# Patient Record
Sex: FEMALE | Employment: OTHER | URBAN - METROPOLITAN AREA
[De-identification: names, ages, dates, MRNs, and addresses within clinical notes are randomized per-mention and may not be internally consistent; named-entity substitution may affect disease eponyms.]

---

## 2017-08-28 ENCOUNTER — LAB ENCOUNTER (OUTPATIENT)
Dept: LAB | Facility: HOSPITAL | Age: 67
End: 2017-08-28
Attending: ORTHOPAEDIC SURGERY
Payer: MEDICARE

## 2017-08-28 DIAGNOSIS — Z01.818 PRE-OP TESTING: Primary | ICD-10-CM

## 2017-08-28 PROBLEM — E78.00 PURE HYPERCHOLESTEROLEMIA: Status: ACTIVE | Noted: 2017-08-28

## 2017-08-28 PROBLEM — N28.1 BENIGN CYST OF RIGHT KIDNEY: Status: ACTIVE | Noted: 2017-08-28

## 2017-08-28 LAB
ANTIBODY SCREEN: NEGATIVE
MRSA DNA SPEC QL NAA+PROBE: NEGATIVE
RH BLOOD TYPE: POSITIVE

## 2017-08-28 PROCEDURE — 36415 COLL VENOUS BLD VENIPUNCTURE: CPT

## 2017-08-28 PROCEDURE — 86850 RBC ANTIBODY SCREEN: CPT

## 2017-08-28 PROCEDURE — 87641 MR-STAPH DNA AMP PROBE: CPT

## 2017-08-28 PROCEDURE — 86901 BLOOD TYPING SEROLOGIC RH(D): CPT

## 2017-08-28 PROCEDURE — 86900 BLOOD TYPING SEROLOGIC ABO: CPT

## 2017-09-05 ENCOUNTER — APPOINTMENT (OUTPATIENT)
Dept: PHYSICAL THERAPY | Facility: HOSPITAL | Age: 67
End: 2017-09-05
Attending: ORTHOPAEDIC SURGERY
Payer: MEDICARE

## 2017-09-07 ENCOUNTER — OFFICE VISIT (OUTPATIENT)
Dept: PHYSICAL THERAPY | Facility: HOSPITAL | Age: 67
End: 2017-09-07
Attending: ORTHOPAEDIC SURGERY
Payer: MEDICARE

## 2017-09-07 PROCEDURE — 87086 URINE CULTURE/COLONY COUNT: CPT | Performed by: INTERNAL MEDICINE

## 2017-09-07 PROCEDURE — 81001 URINALYSIS AUTO W/SCOPE: CPT | Performed by: INTERNAL MEDICINE

## 2017-09-07 NOTE — PROGRESS NOTES
Pre-Op Evaluation    Date of surgery: 9/18/17   Procedure: L TKA  Physician: Dr. Izzy Clemons situation/occupation: Patient lives with her , both retired.  Patient has 3 stairs to enter home, no railings, 4 stairs down from there to bathroom, k

## 2017-09-08 RX ORDER — ACETAMINOPHEN 500 MG
500 TABLET ORAL EVERY 8 HOURS PRN
COMMUNITY
End: 2017-09-21

## 2017-09-08 NOTE — DISCHARGE PLANNING
9/8CM-Pre Admission Screening: This Writer met with Patient for a preadmission screening.  The Patient resides with her  in [facility]  in a single family  home with 4 stairs to her kitchen, 8 stairs,  3 steps to enter.   The Patient has been

## 2017-09-15 NOTE — H&P
ORTHO SURGERY H&P  Guero Tinsley is a 78year old female. MRN is [id number].  Admitted: (Not on file)    CC: Left knee pain    HPI: Ms. Jenny Alanis is a 78year old female with a known history of left knee osteoarthritis who presents complaining of left knee p MEQ tablets, one tablet PO BID  Simvastatin- 40 mg tablets, one tablet PO daily  Vitamin D3- 400 unit tablets, one tablet PO daily                  Review Of Systems:     HEENT: Reports dentures. Otherwise negative. Musculoskeletal: See HPI.    All other mechanical symptoms related to the surgery, blood clot, neurovascular injury, anesthetic complications, and loss of life or limb. In addition, the patient has no contraindications to the surgery such as active infection or neuropathic joint.  Questions were

## 2017-09-18 ENCOUNTER — HOSPITAL ENCOUNTER (INPATIENT)
Facility: HOSPITAL | Age: 67
LOS: 3 days | Discharge: NURSING, REHAB OR LONG-TERM CARE | DRG: 470 | End: 2017-09-21
Attending: ORTHOPAEDIC SURGERY | Admitting: ORTHOPAEDIC SURGERY
Payer: MEDICARE

## 2017-09-18 ENCOUNTER — APPOINTMENT (OUTPATIENT)
Dept: GENERAL RADIOLOGY | Facility: HOSPITAL | Age: 67
DRG: 470 | End: 2017-09-18
Attending: ORTHOPAEDIC SURGERY
Payer: MEDICARE

## 2017-09-18 ENCOUNTER — SURGERY (OUTPATIENT)
Age: 67
End: 2017-09-18

## 2017-09-18 ENCOUNTER — ANESTHESIA (OUTPATIENT)
Dept: SURGERY | Facility: HOSPITAL | Age: 67
End: 2017-09-18

## 2017-09-18 ENCOUNTER — ANESTHESIA EVENT (OUTPATIENT)
Dept: SURGERY | Facility: HOSPITAL | Age: 67
End: 2017-09-18

## 2017-09-18 DIAGNOSIS — M17.12 PRIMARY OSTEOARTHRITIS OF LEFT KNEE: Primary | ICD-10-CM

## 2017-09-18 PROBLEM — E11.65 TYPE 2 DIABETES MELLITUS WITH HYPERGLYCEMIA (HCC): Status: ACTIVE | Noted: 2017-09-18

## 2017-09-18 PROBLEM — R00.1 BRADYCARDIA: Status: ACTIVE | Noted: 2017-09-18

## 2017-09-18 LAB
GLUCOSE BLDC GLUCOMTR-MCNC: 162 MG/DL (ref 70–99)
GLUCOSE BLDC GLUCOMTR-MCNC: 175 MG/DL (ref 70–99)

## 2017-09-18 PROCEDURE — C9113 INJ PANTOPRAZOLE SODIUM, VIA: HCPCS | Performed by: INTERNAL MEDICINE

## 2017-09-18 PROCEDURE — 73560 X-RAY EXAM OF KNEE 1 OR 2: CPT | Performed by: ORTHOPAEDIC SURGERY

## 2017-09-18 PROCEDURE — 88305 TISSUE EXAM BY PATHOLOGIST: CPT | Performed by: ORTHOPAEDIC SURGERY

## 2017-09-18 PROCEDURE — 82962 GLUCOSE BLOOD TEST: CPT

## 2017-09-18 PROCEDURE — 97161 PT EVAL LOW COMPLEX 20 MIN: CPT

## 2017-09-18 PROCEDURE — 97110 THERAPEUTIC EXERCISES: CPT

## 2017-09-18 PROCEDURE — 88311 DECALCIFY TISSUE: CPT | Performed by: ORTHOPAEDIC SURGERY

## 2017-09-18 PROCEDURE — 0SRD0J9 REPLACEMENT OF LEFT KNEE JOINT WITH SYNTHETIC SUBSTITUTE, CEMENTED, OPEN APPROACH: ICD-10-PCS | Performed by: ORTHOPAEDIC SURGERY

## 2017-09-18 DEVICE — PSN FEM CR CMT CCR STD SZ6 L: Type: IMPLANTABLE DEVICE | Site: KNEE | Status: FUNCTIONAL

## 2017-09-18 DEVICE — PSN TIB STM 5 DEG SZ E L: Type: IMPLANTABLE DEVICE | Site: KNEE | Status: FUNCTIONAL

## 2017-09-18 DEVICE — PSN ALL POLY PAT PLY 32MM: Type: IMPLANTABLE DEVICE | Site: KNEE | Status: FUNCTIONAL

## 2017-09-18 DEVICE — CEMENT BONE ZIM PALICOS R +G: Type: IMPLANTABLE DEVICE | Site: KNEE | Status: FUNCTIONAL

## 2017-09-18 RX ORDER — ACETAMINOPHEN 325 MG/1
650 TABLET ORAL ONCE
Status: COMPLETED | OUTPATIENT
Start: 2017-09-18 | End: 2017-09-18

## 2017-09-18 RX ORDER — ONDANSETRON 2 MG/ML
4 INJECTION INTRAMUSCULAR; INTRAVENOUS ONCE AS NEEDED
Status: DISCONTINUED | OUTPATIENT
Start: 2017-09-18 | End: 2017-09-18 | Stop reason: HOSPADM

## 2017-09-18 RX ORDER — ONDANSETRON 2 MG/ML
4 INJECTION INTRAMUSCULAR; INTRAVENOUS ONCE AS NEEDED
Status: COMPLETED | OUTPATIENT
Start: 2017-09-18 | End: 2017-09-18

## 2017-09-18 RX ORDER — HALOPERIDOL 5 MG/ML
0.25 INJECTION INTRAMUSCULAR ONCE AS NEEDED
Status: DISCONTINUED | OUTPATIENT
Start: 2017-09-18 | End: 2017-09-18 | Stop reason: HOSPADM

## 2017-09-18 RX ORDER — HYDROMORPHONE HYDROCHLORIDE 1 MG/ML
0.4 INJECTION, SOLUTION INTRAMUSCULAR; INTRAVENOUS; SUBCUTANEOUS EVERY 5 MIN PRN
Status: DISCONTINUED | OUTPATIENT
Start: 2017-09-18 | End: 2017-09-18 | Stop reason: HOSPADM

## 2017-09-18 RX ORDER — DIPHENHYDRAMINE HYDROCHLORIDE 50 MG/ML
12.5 INJECTION INTRAMUSCULAR; INTRAVENOUS EVERY 4 HOURS PRN
Status: DISCONTINUED | OUTPATIENT
Start: 2017-09-18 | End: 2017-09-21

## 2017-09-18 RX ORDER — DIPHENHYDRAMINE HYDROCHLORIDE 50 MG/ML
25 INJECTION INTRAMUSCULAR; INTRAVENOUS ONCE AS NEEDED
Status: ACTIVE | OUTPATIENT
Start: 2017-09-18 | End: 2017-09-18

## 2017-09-18 RX ORDER — SENNOSIDES 8.6 MG
17.2 TABLET ORAL NIGHTLY
Status: DISCONTINUED | OUTPATIENT
Start: 2017-09-18 | End: 2017-09-21

## 2017-09-18 RX ORDER — MORPHINE SULFATE 4 MG/ML
4 INJECTION, SOLUTION INTRAMUSCULAR; INTRAVENOUS EVERY 2 HOUR PRN
Status: DISCONTINUED | OUTPATIENT
Start: 2017-09-18 | End: 2017-09-21

## 2017-09-18 RX ORDER — GABAPENTIN 100 MG/1
100 CAPSULE ORAL 2 TIMES DAILY
Status: DISCONTINUED | OUTPATIENT
Start: 2017-09-18 | End: 2017-09-21

## 2017-09-18 RX ORDER — NALOXONE HYDROCHLORIDE 0.4 MG/ML
0.08 INJECTION, SOLUTION INTRAMUSCULAR; INTRAVENOUS; SUBCUTANEOUS
Status: ACTIVE | OUTPATIENT
Start: 2017-09-18 | End: 2017-09-19

## 2017-09-18 RX ORDER — METOCLOPRAMIDE 10 MG/1
10 TABLET ORAL ONCE
Status: COMPLETED | OUTPATIENT
Start: 2017-09-18 | End: 2017-09-18

## 2017-09-18 RX ORDER — SODIUM CHLORIDE 9 MG/ML
INJECTION, SOLUTION INTRAVENOUS CONTINUOUS
Status: DISCONTINUED | OUTPATIENT
Start: 2017-09-18 | End: 2017-09-21

## 2017-09-18 RX ORDER — SODIUM CHLORIDE 0.9 % (FLUSH) 0.9 %
10 SYRINGE (ML) INJECTION AS NEEDED
Status: DISCONTINUED | OUTPATIENT
Start: 2017-09-18 | End: 2017-09-21

## 2017-09-18 RX ORDER — DEXTROSE MONOHYDRATE 25 G/50ML
50 INJECTION, SOLUTION INTRAVENOUS AS NEEDED
Status: DISCONTINUED | OUTPATIENT
Start: 2017-09-18 | End: 2017-09-21

## 2017-09-18 RX ORDER — ONDANSETRON 2 MG/ML
4 INJECTION INTRAMUSCULAR; INTRAVENOUS EVERY 4 HOURS PRN
Status: DISCONTINUED | OUTPATIENT
Start: 2017-09-18 | End: 2017-09-21

## 2017-09-18 RX ORDER — HALOPERIDOL 5 MG/ML
0.5 INJECTION INTRAMUSCULAR ONCE AS NEEDED
Status: ACTIVE | OUTPATIENT
Start: 2017-09-18 | End: 2017-09-18

## 2017-09-18 RX ORDER — SODIUM CHLORIDE, SODIUM LACTATE, POTASSIUM CHLORIDE, CALCIUM CHLORIDE 600; 310; 30; 20 MG/100ML; MG/100ML; MG/100ML; MG/100ML
INJECTION, SOLUTION INTRAVENOUS CONTINUOUS
Status: DISCONTINUED | OUTPATIENT
Start: 2017-09-18 | End: 2017-09-21

## 2017-09-18 RX ORDER — ATENOLOL AND CHLORTHALIDONE 50; 25 MG/1; MG/1
1 TABLET ORAL
Status: DISCONTINUED | OUTPATIENT
Start: 2017-09-19 | End: 2017-09-18

## 2017-09-18 RX ORDER — NALBUPHINE HCL 10 MG/ML
2.5 AMPUL (ML) INJECTION EVERY 4 HOURS PRN
Status: ACTIVE | OUTPATIENT
Start: 2017-09-18 | End: 2017-09-19

## 2017-09-18 RX ORDER — BUPIVACAINE HYDROCHLORIDE 7.5 MG/ML
INJECTION, SOLUTION INTRASPINAL AS NEEDED
Status: DISCONTINUED | OUTPATIENT
Start: 2017-09-18 | End: 2017-09-18 | Stop reason: SURG

## 2017-09-18 RX ORDER — HYDROCODONE BITARTRATE AND ACETAMINOPHEN 5; 325 MG/1; MG/1
1 TABLET ORAL AS NEEDED
Status: DISCONTINUED | OUTPATIENT
Start: 2017-09-18 | End: 2017-09-18 | Stop reason: HOSPADM

## 2017-09-18 RX ORDER — DEXTROSE, SODIUM CHLORIDE, AND POTASSIUM CHLORIDE 5; .45; .15 G/100ML; G/100ML; G/100ML
INJECTION INTRAVENOUS CONTINUOUS
Status: DISCONTINUED | OUTPATIENT
Start: 2017-09-18 | End: 2017-09-18

## 2017-09-18 RX ORDER — BISACODYL 10 MG
10 SUPPOSITORY, RECTAL RECTAL
Status: DISCONTINUED | OUTPATIENT
Start: 2017-09-18 | End: 2017-09-21

## 2017-09-18 RX ORDER — OXYCODONE HCL 10 MG/1
10 TABLET, FILM COATED, EXTENDED RELEASE ORAL ONCE
Status: COMPLETED | OUTPATIENT
Start: 2017-09-18 | End: 2017-09-18

## 2017-09-18 RX ORDER — DIPHENHYDRAMINE HCL 25 MG
25 CAPSULE ORAL EVERY 4 HOURS PRN
Status: DISCONTINUED | OUTPATIENT
Start: 2017-09-18 | End: 2017-09-21

## 2017-09-18 RX ORDER — MORPHINE SULFATE 10 MG/ML
6 INJECTION, SOLUTION INTRAMUSCULAR; INTRAVENOUS EVERY 10 MIN PRN
Status: DISCONTINUED | OUTPATIENT
Start: 2017-09-18 | End: 2017-09-18 | Stop reason: HOSPADM

## 2017-09-18 RX ORDER — HYDROCODONE BITARTRATE AND ACETAMINOPHEN 7.5; 325 MG/1; MG/1
2 TABLET ORAL EVERY 6 HOURS PRN
Status: ACTIVE | OUTPATIENT
Start: 2017-09-18 | End: 2017-09-19

## 2017-09-18 RX ORDER — DIPHENHYDRAMINE HCL 25 MG
25 CAPSULE ORAL EVERY 4 HOURS PRN
Status: ACTIVE | OUTPATIENT
Start: 2017-09-18 | End: 2017-09-19

## 2017-09-18 RX ORDER — HYDROCODONE BITARTRATE AND ACETAMINOPHEN 7.5; 325 MG/1; MG/1
1 TABLET ORAL EVERY 6 HOURS PRN
Status: DISPENSED | OUTPATIENT
Start: 2017-09-18 | End: 2017-09-19

## 2017-09-18 RX ORDER — MAGNESIUM HYDROXIDE 1200 MG/15ML
LIQUID ORAL CONTINUOUS PRN
Status: DISCONTINUED | OUTPATIENT
Start: 2017-09-18 | End: 2017-09-18

## 2017-09-18 RX ORDER — ACETAMINOPHEN 325 MG/1
650 TABLET ORAL EVERY 6 HOURS PRN
Status: ACTIVE | OUTPATIENT
Start: 2017-09-18 | End: 2017-09-19

## 2017-09-18 RX ORDER — HYDROMORPHONE HYDROCHLORIDE 1 MG/ML
0.2 INJECTION, SOLUTION INTRAMUSCULAR; INTRAVENOUS; SUBCUTANEOUS EVERY 5 MIN PRN
Status: DISCONTINUED | OUTPATIENT
Start: 2017-09-18 | End: 2017-09-18 | Stop reason: HOSPADM

## 2017-09-18 RX ORDER — MELATONIN
325
Status: DISCONTINUED | OUTPATIENT
Start: 2017-09-18 | End: 2017-09-21

## 2017-09-18 RX ORDER — LOSARTAN POTASSIUM 25 MG/1
25 TABLET ORAL
Status: DISCONTINUED | OUTPATIENT
Start: 2017-09-19 | End: 2017-09-21

## 2017-09-18 RX ORDER — MORPHINE SULFATE 4 MG/ML
4 INJECTION, SOLUTION INTRAMUSCULAR; INTRAVENOUS EVERY 10 MIN PRN
Status: DISCONTINUED | OUTPATIENT
Start: 2017-09-18 | End: 2017-09-18 | Stop reason: HOSPADM

## 2017-09-18 RX ORDER — MORPHINE SULFATE 2 MG/ML
2 INJECTION, SOLUTION INTRAMUSCULAR; INTRAVENOUS EVERY 2 HOUR PRN
Status: DISCONTINUED | OUTPATIENT
Start: 2017-09-18 | End: 2017-09-21

## 2017-09-18 RX ORDER — DIPHENHYDRAMINE HYDROCHLORIDE 50 MG/ML
12.5 INJECTION INTRAMUSCULAR; INTRAVENOUS EVERY 4 HOURS PRN
Status: ACTIVE | OUTPATIENT
Start: 2017-09-18 | End: 2017-09-19

## 2017-09-18 RX ORDER — POLYETHYLENE GLYCOL 3350 17 G/17G
17 POWDER, FOR SOLUTION ORAL DAILY PRN
Status: DISCONTINUED | OUTPATIENT
Start: 2017-09-18 | End: 2017-09-21

## 2017-09-18 RX ORDER — MORPHINE SULFATE 2 MG/ML
2 INJECTION, SOLUTION INTRAMUSCULAR; INTRAVENOUS EVERY 10 MIN PRN
Status: DISCONTINUED | OUTPATIENT
Start: 2017-09-18 | End: 2017-09-18 | Stop reason: HOSPADM

## 2017-09-18 RX ORDER — HYDROMORPHONE HYDROCHLORIDE 1 MG/ML
0.6 INJECTION, SOLUTION INTRAMUSCULAR; INTRAVENOUS; SUBCUTANEOUS EVERY 5 MIN PRN
Status: DISCONTINUED | OUTPATIENT
Start: 2017-09-18 | End: 2017-09-18 | Stop reason: HOSPADM

## 2017-09-18 RX ORDER — FAMOTIDINE 10 MG/ML
20 INJECTION, SOLUTION INTRAVENOUS 2 TIMES DAILY
Status: DISCONTINUED | OUTPATIENT
Start: 2017-09-18 | End: 2017-09-18

## 2017-09-18 RX ORDER — HYDROCODONE BITARTRATE AND ACETAMINOPHEN 5; 325 MG/1; MG/1
2 TABLET ORAL AS NEEDED
Status: DISCONTINUED | OUTPATIENT
Start: 2017-09-18 | End: 2017-09-18 | Stop reason: HOSPADM

## 2017-09-18 RX ORDER — HYDROCODONE BITARTRATE AND ACETAMINOPHEN 7.5; 325 MG/1; MG/1
1 TABLET ORAL EVERY 4 HOURS PRN
Status: DISCONTINUED | OUTPATIENT
Start: 2017-09-18 | End: 2017-09-21

## 2017-09-18 RX ORDER — TRANEXAMIC ACID 100 MG/ML
INJECTION, SOLUTION INTRAVENOUS AS NEEDED
Status: DISCONTINUED | OUTPATIENT
Start: 2017-09-18 | End: 2017-09-18 | Stop reason: SURG

## 2017-09-18 RX ORDER — MIDAZOLAM HYDROCHLORIDE 1 MG/ML
INJECTION INTRAMUSCULAR; INTRAVENOUS AS NEEDED
Status: DISCONTINUED | OUTPATIENT
Start: 2017-09-18 | End: 2017-09-18 | Stop reason: SURG

## 2017-09-18 RX ORDER — CELECOXIB 200 MG/1
200 CAPSULE ORAL DAILY
Status: DISCONTINUED | OUTPATIENT
Start: 2017-09-19 | End: 2017-09-21

## 2017-09-18 RX ORDER — POTASSIUM CHLORIDE 20 MEQ/1
20 TABLET, EXTENDED RELEASE ORAL 2 TIMES DAILY
Status: DISCONTINUED | OUTPATIENT
Start: 2017-09-18 | End: 2017-09-21

## 2017-09-18 RX ORDER — DOCUSATE SODIUM 100 MG/1
100 CAPSULE, LIQUID FILLED ORAL 2 TIMES DAILY
Status: DISCONTINUED | OUTPATIENT
Start: 2017-09-18 | End: 2017-09-21

## 2017-09-18 RX ORDER — MORPHINE SULFATE 2 MG/ML
1 INJECTION, SOLUTION INTRAMUSCULAR; INTRAVENOUS EVERY 2 HOUR PRN
Status: DISCONTINUED | OUTPATIENT
Start: 2017-09-18 | End: 2017-09-21

## 2017-09-18 RX ORDER — NALOXONE HYDROCHLORIDE 0.4 MG/ML
80 INJECTION, SOLUTION INTRAMUSCULAR; INTRAVENOUS; SUBCUTANEOUS AS NEEDED
Status: DISCONTINUED | OUTPATIENT
Start: 2017-09-18 | End: 2017-09-18 | Stop reason: HOSPADM

## 2017-09-18 RX ORDER — GABAPENTIN 100 MG/1
100 CAPSULE ORAL ONCE
Status: COMPLETED | OUTPATIENT
Start: 2017-09-18 | End: 2017-09-18

## 2017-09-18 RX ORDER — OXYCODONE HCL 10 MG/1
10 TABLET, FILM COATED, EXTENDED RELEASE ORAL EVERY 12 HOURS
Status: DISCONTINUED | OUTPATIENT
Start: 2017-09-18 | End: 2017-09-21

## 2017-09-18 RX ORDER — FAMOTIDINE 20 MG/1
20 TABLET ORAL 2 TIMES DAILY
Status: DISCONTINUED | OUTPATIENT
Start: 2017-09-18 | End: 2017-09-18

## 2017-09-18 RX ORDER — FAMOTIDINE 20 MG/1
20 TABLET ORAL ONCE
Status: COMPLETED | OUTPATIENT
Start: 2017-09-18 | End: 2017-09-18

## 2017-09-18 RX ORDER — SODIUM PHOSPHATE, DIBASIC AND SODIUM PHOSPHATE, MONOBASIC 7; 19 G/133ML; G/133ML
1 ENEMA RECTAL ONCE AS NEEDED
Status: DISCONTINUED | OUTPATIENT
Start: 2017-09-18 | End: 2017-09-21

## 2017-09-18 RX ORDER — METOCLOPRAMIDE HYDROCHLORIDE 5 MG/ML
10 INJECTION INTRAMUSCULAR; INTRAVENOUS EVERY 6 HOURS PRN
Status: DISPENSED | OUTPATIENT
Start: 2017-09-18 | End: 2017-09-20

## 2017-09-18 RX ORDER — HYDROMORPHONE HYDROCHLORIDE 1 MG/ML
0.6 INJECTION, SOLUTION INTRAMUSCULAR; INTRAVENOUS; SUBCUTANEOUS
Status: ACTIVE | OUTPATIENT
Start: 2017-09-18 | End: 2017-09-19

## 2017-09-18 RX ORDER — MORPHINE SULFATE 1 MG/ML
INJECTION, SOLUTION EPIDURAL; INTRATHECAL; INTRAVENOUS AS NEEDED
Status: DISCONTINUED | OUTPATIENT
Start: 2017-09-18 | End: 2017-09-18 | Stop reason: SURG

## 2017-09-18 RX ORDER — HYDROMORPHONE HYDROCHLORIDE 1 MG/ML
0.4 INJECTION, SOLUTION INTRAMUSCULAR; INTRAVENOUS; SUBCUTANEOUS
Status: ACTIVE | OUTPATIENT
Start: 2017-09-18 | End: 2017-09-19

## 2017-09-18 RX ADMIN — BUPIVACAINE HYDROCHLORIDE 1.5 ML: 7.5 INJECTION, SOLUTION INTRASPINAL at 07:48:00

## 2017-09-18 RX ADMIN — SODIUM CHLORIDE, SODIUM LACTATE, POTASSIUM CHLORIDE, CALCIUM CHLORIDE: 600; 310; 30; 20 INJECTION, SOLUTION INTRAVENOUS at 07:33:00

## 2017-09-18 RX ADMIN — TRANEXAMIC ACID 1000 MG: 100 INJECTION, SOLUTION INTRAVENOUS at 09:20:00

## 2017-09-18 RX ADMIN — TRANEXAMIC ACID 1000 MG: 100 INJECTION, SOLUTION INTRAVENOUS at 08:03:00

## 2017-09-18 RX ADMIN — MIDAZOLAM HYDROCHLORIDE 2 MG: 1 INJECTION INTRAMUSCULAR; INTRAVENOUS at 07:36:00

## 2017-09-18 RX ADMIN — MORPHINE SULFATE 0.3 MG: 1 INJECTION, SOLUTION EPIDURAL; INTRATHECAL; INTRAVENOUS at 07:48:00

## 2017-09-18 RX ADMIN — SODIUM CHLORIDE, SODIUM LACTATE, POTASSIUM CHLORIDE, CALCIUM CHLORIDE: 600; 310; 30; 20 INJECTION, SOLUTION INTRAVENOUS at 09:49:00

## 2017-09-18 NOTE — ANESTHESIA PREPROCEDURE EVALUATION
Anesthesia PreOp Note    HPI:     Ish Wong is a 78year old female who presents for preoperative consultation requested by: Lillian Li MD    Date of Surgery: 9/18/2017    Procedure(s):  KNEE TOTAL REPLACEMENT  Indication: Left knee Gisselle Binu Disp: 180 tablet Rfl: 3 9/17/2017 at 0900   Calcium Carbonate Antacid 600 MG Oral Chew Tab Chew 600 mg by mouth 2 (two) times daily. Disp:  Rfl:  9/16/2017   Cholecalciferol (VITAMIN D3) 400 UNITS Oral Tab Take 400 Units by mouth.  Disp:  Rfl:  9/17/2017 at 33.2 08/29/2017   RDW 13.4 08/29/2017    08/29/2017       Lab Results  Component Value Date    08/29/2017   K 4.0 08/29/2017    (L) 08/29/2017   CO2 26.4 08/29/2017   BUN 14 08/29/2017   CREATSERUM 0.67 08/29/2017    (H) 08/29/201 complications, and any alternative forms of anesthetic management. All of the patient's questions were answered to the best of my ability. The patient desires the anesthetic management as planned.   Audi Mensah  9/18/2017 7:17 AM

## 2017-09-18 NOTE — ANESTHESIA PROCEDURE NOTES
Spinal Block  Performed by: Olman Rollins  Authorized by: Alice Nash     Patient Location:  OR  Start Time:  9/18/2017 7:46 AM  End Time:  9/18/2017 7:48 AM  Site identification: surface landmarks    Anesthesiologist:  Alice Nash  CRNA:  RENATA QUIROS

## 2017-09-18 NOTE — PHYSICAL THERAPY NOTE
PHYSICAL THERAPY KNEE EVALUATION - INPATIENT     Room Number: 123/238-F  Evaluation Date: 9/18/2017  Type of Evaluation: Initial  Physician Order: PT Eval and Treat    Presenting Problem: L TKA  Reason for Therapy: Mobility Dysfunction and Discharge Planni Problem:    Primary osteoarthritis of left knee  Active Problems:    Essential hypertension    Multiple lung nodules on CT    Pure hypercholesterolemia    Type 2 diabetes mellitus with hyperglycemia (HCC)    Bradycardia      Past Medical History  Past Medi degrees    Lower extremity strength is within functional limits; pt displayed L LE strength deficit during bed mobility.     BALANCE  Static Sitting: Normal  Dynamic Sitting: Normal  Static Standing: Fair +  Dynamic Standing: Fair + be met by: 9/25/17  Patient Goal Patient's self-stated goal is: to walk without pain   Goal #1 Patient is able to demonstrate supine - sit EOB @ level: modified independent     Goal #1   Current Status    Goal #2 Patient is able to demonstrate transfers Si

## 2017-09-18 NOTE — PHYSICAL THERAPY NOTE
Attempted to see pt this PM but the pt was nauseated per RN and family.  RN states that she will give pt medication for nausea and will attempt later as time permits

## 2017-09-18 NOTE — DISCHARGE PLANNING
9/18CM-MD orders received in regards to discharge planning-Please look into [facility]. This Writer met with the Patient and her  German Medina ([phone]) at bedside.  They are both requesting a referral to [facility], and Huan JOHNSON to send out referr

## 2017-09-18 NOTE — H&P
[facility]    History and Physical    Denver Lords Patient Status:  Inpatient    1950 MRN [id number]   Location [facility] 4W/SW/SE Attending Delphine Diamond MD   Hosp Day # 0 PCP Edy Cornejo MD     Date:  20 (500 mg total) by mouth daily with breakfast.   Atenolol-Chlorthalidone 50-25 MG Oral Tab Take 1 tablet by mouth once daily.    LOSARTAN POTASSIUM 25 MG Oral Tab TAKE 1 TABLET BY MOUTH ONCE DAILY   Potassium Chloride ER 20 MEQ Oral Tab CR Take 1 tablet (20 no rales. Abdominal: Soft. She exhibits no mass. There is no tenderness. Musculoskeletal:   Bulky dressings in place left knee   Lymphadenopathy:     She has no cervical adenopathy. Neurological: She is alert and oriented to person, place, and time. metformin at current time. We will monitor Accu-Cheks and cover with sliding scale insulin as needed. Continue same lipid-lowering agent when patient is taking po. Patient does have a history of benign-appearing lung nodules on CT.   She gets yearly

## 2017-09-18 NOTE — ANESTHESIA POSTPROCEDURE EVALUATION
Patient: Reza Lopez    Procedure Summary     Date:  09/18/17 Room / Location:  [address] [place] [address] MAIN OR 12 / [address] [place] [address] MAIN OR    Anesthesia Start:  4611 Anesthesia Stop:  6562    Procedure:  KNEE TOTAL REPLACEMENT (Left ) Diagnosis:  (Left knee osteoarthritis, primary)

## 2017-09-18 NOTE — OPERATIVE REPORT
[facility]    PATIENT'S NAME: Teresita Ashly DAVIS   ATTENDING PHYSICIAN: Elver Vides MD   OPERATING PHYSICIAN: Elver Vides MD   PATIENT ACCOUNT#:   [id number]    LOCATION:  [facility] [address] PACU [facility] 10  MEDICAL RECORD #:   [id number]       D technique. Next, a tourniquet was placed on the left thigh and SCD device was placed on the right lower extremity. All bony prominences were well padded. She was given preoperative antibiotics and tranexamic acid.   The left knee and lower extremity were fragments were again sized on the back table and appeared consistent with the preoperative plan. Next, attention was turned to the tibia. The patient-specific pin guide for the tibia was applied to the anteromedial tibia.   The anterior and superior pins paid to hemostasis. The knee was copiously irrigated. All excess cement was removed. The retinacular layer was repaired over a 1/8-inch Hemovac drain which exited the anterolateral thigh. We used #1 Ethibond sutures for retinacular closure.   Skin and s

## 2017-09-19 PROBLEM — E87.6 HYPOKALEMIA: Status: ACTIVE | Noted: 2017-09-19

## 2017-09-19 LAB
ANION GAP SERPL CALC-SCNC: 10 MMOL/L (ref 0–18)
BASOPHILS # BLD: 0 K/UL (ref 0–0.2)
BASOPHILS NFR BLD: 0 %
BUN SERPL-MCNC: 12 MG/DL (ref 8–20)
BUN/CREAT SERPL: 19.7 (ref 10–20)
CALCIUM SERPL-MCNC: 8 MG/DL (ref 8.5–10.5)
CHLORIDE SERPL-SCNC: 95 MMOL/L (ref 95–110)
CO2 SERPL-SCNC: 27 MMOL/L (ref 22–32)
CREAT SERPL-MCNC: 0.61 MG/DL (ref 0.5–1.5)
EOSINOPHIL # BLD: 0 K/UL (ref 0–0.7)
EOSINOPHIL NFR BLD: 0 %
ERYTHROCYTE [DISTWIDTH] IN BLOOD BY AUTOMATED COUNT: 13.8 % (ref 11–15)
GLUCOSE BLDC GLUCOMTR-MCNC: 145 MG/DL (ref 70–99)
GLUCOSE SERPL-MCNC: 135 MG/DL (ref 70–99)
HCT VFR BLD AUTO: 33.4 % (ref 35–48)
HGB BLD-MCNC: 11.2 G/DL (ref 12–16)
LYMPHOCYTES # BLD: 2.2 K/UL (ref 1–4)
LYMPHOCYTES NFR BLD: 21 %
MCH RBC QN AUTO: 29.3 PG (ref 27–32)
MCHC RBC AUTO-ENTMCNC: 33.6 G/DL (ref 32–37)
MCV RBC AUTO: 87.3 FL (ref 80–100)
MONOCYTES # BLD: 0.7 K/UL (ref 0–1)
MONOCYTES NFR BLD: 7 %
NEUTROPHILS # BLD AUTO: 7.6 K/UL (ref 1.8–7.7)
NEUTROPHILS NFR BLD: 72 %
OSMOLALITY UR CALC.SUM OF ELEC: 276 MOSM/KG (ref 275–295)
PLATELET # BLD AUTO: 147 K/UL (ref 140–400)
PMV BLD AUTO: 9 FL (ref 7.4–10.3)
POTASSIUM SERPL-SCNC: 2.9 MMOL/L (ref 3.3–5.1)
POTASSIUM SERPL-SCNC: 3.3 MMOL/L (ref 3.3–5.1)
RBC # BLD AUTO: 3.82 M/UL (ref 3.7–5.4)
SODIUM SERPL-SCNC: 132 MMOL/L (ref 136–144)
WBC # BLD AUTO: 10.5 K/UL (ref 4–11)

## 2017-09-19 PROCEDURE — 84132 ASSAY OF SERUM POTASSIUM: CPT | Performed by: INTERNAL MEDICINE

## 2017-09-19 PROCEDURE — 97110 THERAPEUTIC EXERCISES: CPT

## 2017-09-19 PROCEDURE — 97530 THERAPEUTIC ACTIVITIES: CPT

## 2017-09-19 PROCEDURE — 97116 GAIT TRAINING THERAPY: CPT

## 2017-09-19 PROCEDURE — 82962 GLUCOSE BLOOD TEST: CPT

## 2017-09-19 PROCEDURE — 80048 BASIC METABOLIC PNL TOTAL CA: CPT | Performed by: INTERNAL MEDICINE

## 2017-09-19 PROCEDURE — 97165 OT EVAL LOW COMPLEX 30 MIN: CPT

## 2017-09-19 PROCEDURE — 97535 SELF CARE MNGMENT TRAINING: CPT

## 2017-09-19 PROCEDURE — C9113 INJ PANTOPRAZOLE SODIUM, VIA: HCPCS | Performed by: INTERNAL MEDICINE

## 2017-09-19 PROCEDURE — 85025 COMPLETE CBC W/AUTO DIFF WBC: CPT | Performed by: INTERNAL MEDICINE

## 2017-09-19 RX ORDER — POTASSIUM CHLORIDE 20 MEQ/1
40 TABLET, EXTENDED RELEASE ORAL ONCE
Status: COMPLETED | OUTPATIENT
Start: 2017-09-19 | End: 2017-09-19

## 2017-09-19 RX ORDER — PANTOPRAZOLE SODIUM 40 MG/1
40 TABLET, DELAYED RELEASE ORAL
Status: DISCONTINUED | OUTPATIENT
Start: 2017-09-19 | End: 2017-09-21

## 2017-09-19 RX ORDER — ARIPIPRAZOLE 15 MG/1
40 TABLET ORAL ONCE
Status: DISCONTINUED | OUTPATIENT
Start: 2017-09-19 | End: 2017-09-19

## 2017-09-19 RX ORDER — KETOROLAC TROMETHAMINE 15 MG/ML
15 INJECTION, SOLUTION INTRAMUSCULAR; INTRAVENOUS ONCE
Status: COMPLETED | OUTPATIENT
Start: 2017-09-19 | End: 2017-09-19

## 2017-09-19 RX ORDER — POTASSIUM CHLORIDE 20 MEQ/1
40 TABLET, EXTENDED RELEASE ORAL EVERY 4 HOURS
Status: COMPLETED | OUTPATIENT
Start: 2017-09-19 | End: 2017-09-19

## 2017-09-19 NOTE — PROGRESS NOTES
Subjective: Very emotional, c/o severe L knee pain    Objective:  Patient Vitals for the past 24 hrs:   BP Temp Temp src Pulse Resp SpO2   09/19/17 0429 (P) 103/50 (P) 98.6 °F (37 °C) (P) Oral 57 (P) 18 -   09/19/17 0001 101/56 98.4 °F (36.9 °C) Oral 53 18

## 2017-09-19 NOTE — OCCUPATIONAL THERAPY NOTE
OCCUPATIONAL THERAPY EVALUATION - INPATIENT      Room Number: 626/230-S  Evaluation Date: 9/19/2017  Type of Evaluation: Initial  Presenting Problem:  (LT TKA)    Physician Order: IP Consult to Occupational Therapy  Reason for Therapy: ADL/IADL Dysfunction comorbidity)    • Osteoarthritis    • S/P cholecystectomy    • S/P NISHI (total abdominal hysterectomy)    • Type II or unspecified type diabetes mellitus without mention of complication, not stated as uncontrolled    • Vertigo        Past Surgical History Scale): 44.27  CMS Modifier (G-Code): CJ    FUNCTIONAL TRANSFER ASSESSMENT  Supine to Sit : Not tested  Sit to Stand: Minimum assistance  Toilet Transfer: CGA with cues for hand and foot placement  Chair Transfer: CGA      Bedroom Mobility: CGA for bed to

## 2017-09-19 NOTE — PROGRESS NOTES
[facility] Note: Route Optimization for Pantoprazole (PROTONIX)    Patient is currently on Pantoprazole (PROTONIX) 40 mg IV every 24 hours.    The patient meets the criteria to convert to the oral equivalent as established by the IV to Oral conversion pro

## 2017-09-19 NOTE — CM/SW NOTE
+ [facility] . Rick Vickers Rick Vickers Patient is a 78year old female admitted with Osteoarthritis of the Left Knee. She is enrolled in the Medicare PCI program under  (Left TKA). [facility] brochure, letter and CTL's phone # provided. 22 Kiersten Odonnell [phone]

## 2017-09-19 NOTE — PHYSICAL THERAPY NOTE
PHYSICAL THERAPY KNEE EVALUATION - INPATIENT     Room Number: 037/554-H  Evaluation Date: 9/18/2017  Type of Evaluation: Initial  Physician Order: PT Eval and Treat    Presenting Problem: L TKA  Reason for Therapy: Mobility Dysfunction and Discharge Planni hysterectomy)    • Type II or unspecified type diabetes mellitus without mention of complication, not stated as uncontrolled    • Vertigo        Past Surgical History  Past Surgical History:  No date: CHOLECYSTECTOMY  No date: HYSTERECTOMY      Comment: on Little   -   Moving from lying on back to sitting on the side of the bed?: A Little   How much help from another person does the patient currently need. ..   -   Moving to and from a bed to a chair (including a wheelchair)?: A Little   -   Need to walk in h home exercise program for ROM/strengthening per the instructions provided in preparation for discharge.    Goal #6  Current Status In progress

## 2017-09-19 NOTE — PROGRESS NOTES
[facility]    Progress Note    Cyndy Sero Patient Status:  Inpatient    1950 MRN [id number]   Location [facility] 4W/SW/SE Attending Natalie Gaston MD   Hosp Day # 1 PCP Tessy Kendrick MD     Subjective:     Cons therapy  Possible discharge to rehabilitation tomorrow    Blood pressure is stable and under control. Patient does have hypokalemia. We'll replace potassium and monitor    Bradycardia has also resolved.       Mirlande Turk MD  9/19/2017

## 2017-09-20 LAB
ERYTHROCYTE [DISTWIDTH] IN BLOOD BY AUTOMATED COUNT: 13.7 % (ref 11–15)
GLUCOSE BLDC GLUCOMTR-MCNC: 147 MG/DL (ref 70–99)
GLUCOSE BLDC GLUCOMTR-MCNC: 156 MG/DL (ref 70–99)
GLUCOSE BLDC GLUCOMTR-MCNC: 163 MG/DL (ref 70–99)
GLUCOSE BLDC GLUCOMTR-MCNC: 164 MG/DL (ref 70–99)
HCT VFR BLD AUTO: 33.1 % (ref 35–48)
HGB BLD-MCNC: 11.3 G/DL (ref 12–16)
MCH RBC QN AUTO: 29.9 PG (ref 27–32)
MCHC RBC AUTO-ENTMCNC: 34.2 G/DL (ref 32–37)
MCV RBC AUTO: 87.4 FL (ref 80–100)
PLATELET # BLD AUTO: 144 K/UL (ref 140–400)
PMV BLD AUTO: 9.2 FL (ref 7.4–10.3)
POTASSIUM SERPL-SCNC: 3.7 MMOL/L (ref 3.3–5.1)
RBC # BLD AUTO: 3.78 M/UL (ref 3.7–5.4)
WBC # BLD AUTO: 8.4 K/UL (ref 4–11)

## 2017-09-20 PROCEDURE — 97110 THERAPEUTIC EXERCISES: CPT

## 2017-09-20 PROCEDURE — 84132 ASSAY OF SERUM POTASSIUM: CPT | Performed by: INTERNAL MEDICINE

## 2017-09-20 PROCEDURE — 97530 THERAPEUTIC ACTIVITIES: CPT

## 2017-09-20 PROCEDURE — 97116 GAIT TRAINING THERAPY: CPT

## 2017-09-20 PROCEDURE — 85027 COMPLETE CBC AUTOMATED: CPT | Performed by: ORTHOPAEDIC SURGERY

## 2017-09-20 PROCEDURE — 82962 GLUCOSE BLOOD TEST: CPT

## 2017-09-20 NOTE — PROGRESS NOTES
Subjective: No complaints. Pain controlled.     Objective:  Patient Vitals for the past 24 hrs:   BP Temp Temp src Pulse Resp SpO2   09/20/17 0815 122/76 - - 65 - 96 %   09/20/17 0510 119/62 98.6 °F (37 °C) Oral 67 18 94 %   09/19/17 2100 111/59 98.5 °F (36

## 2017-09-20 NOTE — DISCHARGE PLANNING
The pt. Is scheduled to discharge to [facility] 9/21 at 1p, via [employer]. The pt. Is aware and agreeable.       Report [facility], [address] [place] [address]

## 2017-09-20 NOTE — PROGRESS NOTES
[facility] - [facility]    Progress Note    Denver Lords Patient Status:  Inpatient    1950 MRN [id number]   Location [facility] 4W/SW/SE Attending Roosevelt Echevarria MD   Hosp Day # 2 PCP Edy Cornejo MD     Subjective:     Cons is discharged    Bradycardia has resolved. Potassium has now normalized. We'll continue to monitor.       Gustavo Foster MD  9/20/2017

## 2017-09-20 NOTE — PHYSICAL THERAPY NOTE
PHYSICAL THERAPY KNEE EVALUATION - INPATIENT     Room Number: 990/891-X  Evaluation Date: 9/18/2017  Type of Evaluation: Initial  Physician Order: PT Eval and Treat    Presenting Problem: L TKA  Reason for Therapy: Mobility Dysfunction and Discharge Planni History  Past Surgical History:  No date: CHOLECYSTECTOMY  No date: HYSTERECTOMY      Comment: one ovary removed  No date: TOTAL KNEE REPLACEMENT Left    HOME SITUATION  Type of Home: House   Home Layout: Multi-level (4 GERARDO, 8 to BR)  Stairs to Enter : 4 currently need. ..   -   Moving to and from a bed to a chair (including a wheelchair)?: A Little   -   Need to walk in hospital room?: A Little   -   Climbing 3-5 steps with a railing?: A Lot     AM-PAC Score:  Raw Score: 17   PT Approx Degree of Impairment progress

## 2017-09-20 NOTE — CM/SW NOTE
+BPCI.  CTL met with patient at bedside to explain and enroll in the Medicare BPCI program under  (L TKA) She agreed with phone follow up for 3 months from [facility]. BPCI letter, brochure and CTL's phone # provided.   Patient anticipates disc

## 2017-09-21 VITALS
HEART RATE: 71 BPM | OXYGEN SATURATION: 98 % | RESPIRATION RATE: 16 BRPM | SYSTOLIC BLOOD PRESSURE: 141 MMHG | HEIGHT: 61.5 IN | TEMPERATURE: 99 F | WEIGHT: 205 LBS | DIASTOLIC BLOOD PRESSURE: 69 MMHG | BODY MASS INDEX: 38.21 KG/M2

## 2017-09-21 LAB
ANION GAP SERPL CALC-SCNC: 6 MMOL/L (ref 0–18)
BASOPHILS # BLD: 0 K/UL (ref 0–0.2)
BASOPHILS NFR BLD: 0 %
BUN SERPL-MCNC: 10 MG/DL (ref 8–20)
BUN/CREAT SERPL: 21.7 (ref 10–20)
CALCIUM SERPL-MCNC: 8.5 MG/DL (ref 8.5–10.5)
CHLORIDE SERPL-SCNC: 99 MMOL/L (ref 95–110)
CO2 SERPL-SCNC: 30 MMOL/L (ref 22–32)
CREAT SERPL-MCNC: 0.46 MG/DL (ref 0.5–1.5)
EOSINOPHIL # BLD: 0.1 K/UL (ref 0–0.7)
EOSINOPHIL NFR BLD: 1 %
ERYTHROCYTE [DISTWIDTH] IN BLOOD BY AUTOMATED COUNT: 13.4 % (ref 11–15)
GLUCOSE BLDC GLUCOMTR-MCNC: 141 MG/DL (ref 70–99)
GLUCOSE BLDC GLUCOMTR-MCNC: 155 MG/DL (ref 70–99)
GLUCOSE SERPL-MCNC: 128 MG/DL (ref 70–99)
HCT VFR BLD AUTO: 31.7 % (ref 35–48)
HGB BLD-MCNC: 10.7 G/DL (ref 12–16)
LYMPHOCYTES # BLD: 1.8 K/UL (ref 1–4)
LYMPHOCYTES NFR BLD: 25 %
MCH RBC QN AUTO: 29.8 PG (ref 27–32)
MCHC RBC AUTO-ENTMCNC: 33.9 G/DL (ref 32–37)
MCV RBC AUTO: 88 FL (ref 80–100)
MONOCYTES # BLD: 0.7 K/UL (ref 0–1)
MONOCYTES NFR BLD: 10 %
NEUTROPHILS # BLD AUTO: 4.6 K/UL (ref 1.8–7.7)
NEUTROPHILS NFR BLD: 63 %
OSMOLALITY UR CALC.SUM OF ELEC: 281 MOSM/KG (ref 275–295)
PLATELET # BLD AUTO: 160 K/UL (ref 140–400)
PMV BLD AUTO: 8.7 FL (ref 7.4–10.3)
POTASSIUM SERPL-SCNC: 3.6 MMOL/L (ref 3.3–5.1)
RBC # BLD AUTO: 3.6 M/UL (ref 3.7–5.4)
SODIUM SERPL-SCNC: 135 MMOL/L (ref 136–144)
WBC # BLD AUTO: 7.3 K/UL (ref 4–11)

## 2017-09-21 PROCEDURE — 80048 BASIC METABOLIC PNL TOTAL CA: CPT | Performed by: INTERNAL MEDICINE

## 2017-09-21 PROCEDURE — 82962 GLUCOSE BLOOD TEST: CPT

## 2017-09-21 PROCEDURE — 97110 THERAPEUTIC EXERCISES: CPT

## 2017-09-21 PROCEDURE — 97116 GAIT TRAINING THERAPY: CPT

## 2017-09-21 PROCEDURE — 85025 COMPLETE CBC W/AUTO DIFF WBC: CPT | Performed by: INTERNAL MEDICINE

## 2017-09-21 PROCEDURE — 97530 THERAPEUTIC ACTIVITIES: CPT

## 2017-09-21 RX ORDER — OXYCODONE HCL 10 MG/1
10 TABLET, FILM COATED, EXTENDED RELEASE ORAL EVERY 12 HOURS
Qty: 10 TABLET | Refills: 0 | Status: SHIPPED | OUTPATIENT
Start: 2017-09-21 | End: 2017-09-21

## 2017-09-21 RX ORDER — CELECOXIB 200 MG/1
200 CAPSULE ORAL DAILY
Qty: 5 CAPSULE | Refills: 0 | Status: SHIPPED | OUTPATIENT
Start: 2017-09-21 | End: 2017-09-26

## 2017-09-21 RX ORDER — DIPHENHYDRAMINE HCL 25 MG
25 CAPSULE ORAL EVERY 6 HOURS PRN
Status: DISCONTINUED | OUTPATIENT
Start: 2017-09-21 | End: 2017-09-21

## 2017-09-21 RX ORDER — OXYCODONE HCL 10 MG/1
10 TABLET, FILM COATED, EXTENDED RELEASE ORAL EVERY 12 HOURS
Qty: 10 TABLET | Refills: 0 | Status: SHIPPED | OUTPATIENT
Start: 2017-09-21 | End: 2017-09-26

## 2017-09-21 RX ORDER — PSEUDOEPHEDRINE HCL 30 MG
100 TABLET ORAL 2 TIMES DAILY PRN
Qty: 30 CAPSULE | Refills: 0 | Status: SHIPPED | OUTPATIENT
Start: 2017-09-21 | End: 2017-10-06

## 2017-09-21 RX ORDER — HYDROCODONE BITARTRATE AND ACETAMINOPHEN 7.5; 325 MG/1; MG/1
1 TABLET ORAL EVERY 4 HOURS PRN
Qty: 40 TABLET | Refills: 0 | Status: SHIPPED | OUTPATIENT
Start: 2017-09-21 | End: 2018-01-08

## 2017-09-21 RX ORDER — GABAPENTIN 100 MG/1
100 CAPSULE ORAL 2 TIMES DAILY
Qty: 10 CAPSULE | Refills: 0 | Status: SHIPPED | OUTPATIENT
Start: 2017-09-21 | End: 2017-09-26

## 2017-09-21 RX ORDER — PANTOPRAZOLE SODIUM 40 MG/1
40 TABLET, DELAYED RELEASE ORAL
Qty: 30 TABLET | Refills: 0 | Status: SHIPPED | OUTPATIENT
Start: 2017-09-22 | End: 2017-09-21

## 2017-09-21 RX ORDER — DIPHENHYDRAMINE HCL 25 MG
25 CAPSULE ORAL NIGHTLY PRN
Qty: 30 CAPSULE | Refills: 0 | Status: SHIPPED | OUTPATIENT
Start: 2017-09-21 | End: 2017-10-23 | Stop reason: ALTCHOICE

## 2017-09-21 RX ORDER — METFORMIN HYDROCHLORIDE 500 MG/1
500 TABLET, EXTENDED RELEASE ORAL 2 TIMES DAILY WITH MEALS
Qty: 60 TABLET | Refills: 5 | Status: SHIPPED | OUTPATIENT
Start: 2017-09-21 | End: 2018-04-14

## 2017-09-21 RX ORDER — HYDROCODONE BITARTRATE AND ACETAMINOPHEN 7.5; 325 MG/1; MG/1
1 TABLET ORAL EVERY 4 HOURS PRN
Qty: 40 TABLET | Refills: 0 | Status: SHIPPED | OUTPATIENT
Start: 2017-09-21 | End: 2017-09-21

## 2017-09-21 NOTE — PHYSICAL THERAPY NOTE
PHYSICAL THERAPY KNEE EVALUATION - INPATIENT     Room Number: 390/629-V  Evaluation Date: 9/18/2017  Type of Evaluation: Initial  Physician Order: PT Eval and Treat    Presenting Problem: L TKA  Reason for Therapy: Mobility Dysfunction and Discharge Planni SITUATION  Type of Home: House   Home Layout: Multi-level (4 GERARDO, 8 to BR)  Stairs to Enter : 4  Railing: Yes  Stairs to Bedroom: 8  Railing: Yes    Lives With: Spouse (Antonia Carver is retired and able to assist)  Drives: Yes  Patient Owned Equipment: None       Pr steps with a railing?: A Lot     AM-PAC Score:  Raw Score: 17   PT Approx Degree of Impairment Score: 50.57%   Standardized Score (AM-PAC Scale): 42.13   CMS Modifier (G-Code): CK    FUNCTIONAL ABILITY STATUS  Gait Assessment   Gait Assistance: Minimum ass

## 2017-09-21 NOTE — PROGRESS NOTES
Subjective: No complaints. Pain controlled. Denies chest pain, shortness of breath, calf pain, calf swelling, lightheadedness, and dizziness.     Objective:  Patient Vitals for the past 24 hrs:   BP Temp Temp src Pulse Resp SpO2   09/21/17 0454 141/69 98.5

## 2017-09-21 NOTE — PROGRESS NOTES
[facility] Patient Status:  Inpatient    1950 MRN [id number]   Location [facility] 4W/SW/SE Attending Leo Adhikari MD   Hosp Day # 3 PCP Asia Wheat MD     Sada Angel is a 78year old female 9/22/2017] Pantoprazole Sodium 40 MG Oral Tab EC Take 1 tablet (40 mg total) by mouth every morning before breakfast. Disp: 30 tablet Rfl: 0         Lisinopril                  Comment:aches        Blood pressure 141/69, pulse 71, temperature 98.5 °F (36.9 2 diabetes mellitus with hyperglycemia (HCC)  Better control. Will resume bid dose of metformin upon discharge. Continue restricted diabetic diet. Essential hypertension  Well controlled on losartan. Pure hypercholesterolemia  On statin.   Con Chris

## 2017-09-21 NOTE — DISCHARGE SUMMARY
[facility]    Discharge Summary    Bib Garcia Patient Status:  Inpatient    1950 MRN [id number]   Location [facility] 4W/SW/SE Attending Bryan Jara MD   Hosp Day # 3 PCP Eamon Hodges MD     Date of Admission: [facility] for strengthening. She was instructed to follow up with myself and ortho post discharge from inpatient rehab. She is to call with any problems or concerns in the meantime.   The case was also discussed with patient's hu Tab  TAKE 1 TABLET BY MOUTH DAILY              Discharge Diet: Diabetic diet    Discharge Activity: Up with assistance    Follow up: Follow-up Information     Liberty Castellon MD. Schedule an appointment as soon as possible for a visit in 2 weeks.

## 2017-09-22 ENCOUNTER — SNF VISIT (OUTPATIENT)
Dept: INTERNAL MEDICINE CLINIC | Facility: SKILLED NURSING FACILITY | Age: 67
End: 2017-09-22

## 2017-09-22 DIAGNOSIS — I10 ESSENTIAL HYPERTENSION: ICD-10-CM

## 2017-09-22 DIAGNOSIS — E87.6 HYPOKALEMIA: ICD-10-CM

## 2017-09-22 DIAGNOSIS — M17.12 PRIMARY OSTEOARTHRITIS OF LEFT KNEE: ICD-10-CM

## 2017-09-22 DIAGNOSIS — E11.65 TYPE 2 DIABETES MELLITUS WITH HYPERGLYCEMIA, UNSPECIFIED LONG TERM INSULIN USE STATUS: ICD-10-CM

## 2017-09-22 DIAGNOSIS — Z96.652 STATUS POST TOTAL LEFT KNEE REPLACEMENT: ICD-10-CM

## 2017-09-22 DIAGNOSIS — R00.1 BRADYCARDIA: ICD-10-CM

## 2017-09-22 PROCEDURE — 99309 SBSQ NF CARE MODERATE MDM 30: CPT | Performed by: NURSE PRACTITIONER

## 2017-09-22 NOTE — PROGRESS NOTES
HPI: Janelle Walters  Is a 78year old female with a history of diabetes, obesity, HTN, hyperlipidemia who was admitted to [address] [place] [address] for an elective left total knee arthroplasty by Dr. Valdo Pendleton 9/18/17.  Postoperatively she developed transient bradycardia and bet PERRLA, EOMI, sclera anicteric, conjunctiva normal. Neck supple,   RESPIRATORY:CTAB  CARDIOVASCULAR: S1, S2 normal, RRR; no S3, no S4; , no murmur  ABDOMEN:  normal active BS+, soft, non distended, nontender   MUSCULOSKELETAL/EXTREMITIES: LLE post op edema

## 2017-09-25 LAB
GLUCOSE BLDC GLUCOMTR-MCNC: 140 MG/DL (ref 70–99)
GLUCOSE BLDC GLUCOMTR-MCNC: 191 MG/DL (ref 70–99)
GLUCOSE BLDC GLUCOMTR-MCNC: 192 MG/DL (ref 70–99)
GLUCOSE BLDC GLUCOMTR-MCNC: 197 MG/DL (ref 70–99)
GLUCOSE BLDC GLUCOMTR-MCNC: 200 MG/DL (ref 70–99)

## 2017-09-26 ENCOUNTER — SNF VISIT (OUTPATIENT)
Dept: INTERNAL MEDICINE CLINIC | Facility: SKILLED NURSING FACILITY | Age: 67
End: 2017-09-26

## 2017-09-26 DIAGNOSIS — E11.65 TYPE 2 DIABETES MELLITUS WITH HYPERGLYCEMIA, UNSPECIFIED LONG TERM INSULIN USE STATUS: ICD-10-CM

## 2017-09-26 DIAGNOSIS — E87.6 HYPOKALEMIA: ICD-10-CM

## 2017-09-26 DIAGNOSIS — Z96.652 STATUS POST TOTAL LEFT KNEE REPLACEMENT: ICD-10-CM

## 2017-09-26 DIAGNOSIS — E78.00 PURE HYPERCHOLESTEROLEMIA: ICD-10-CM

## 2017-09-26 DIAGNOSIS — M17.12 PRIMARY OSTEOARTHRITIS OF LEFT KNEE: ICD-10-CM

## 2017-09-26 DIAGNOSIS — I10 ESSENTIAL HYPERTENSION: ICD-10-CM

## 2017-09-26 DIAGNOSIS — R00.1 BRADYCARDIA: ICD-10-CM

## 2017-09-26 PROCEDURE — 99309 SBSQ NF CARE MODERATE MDM 30: CPT | Performed by: NURSE PRACTITIONER

## 2017-09-26 NOTE — PROGRESS NOTES
HPI: Amberly Lay  Is a 78year old female with a history of diabetes, obesity, HTN, hyperlipidemia who was admitted to [address] [place] [address] for an elective left total knee arthroplasty by Dr. Savannah Ferrer 9/18/17.  Postoperatively she developed transient bradycardia and bet EOMI, sclera anicteric, conjunctiva normal. Neck supple,   RESPIRATORY:CTAB  CARDIOVASCULAR: S1, S2 normal, RRR; no S3, no S4; , no murmur  ABDOMEN:  normal active BS+, soft, non distended, nontender   MUSCULOSKELETAL/EXTREMITIES: LLE mild post op edema, n

## 2017-10-23 PROBLEM — Z96.652 S/P TKR (TOTAL KNEE REPLACEMENT), LEFT: Status: ACTIVE | Noted: 2017-10-23

## 2018-01-08 PROBLEM — E11.9 CONTROLLED TYPE 2 DIABETES MELLITUS WITHOUT COMPLICATION, WITHOUT LONG-TERM CURRENT USE OF INSULIN (HCC): Status: ACTIVE | Noted: 2018-01-08

## 2018-07-23 ENCOUNTER — HOSPITAL ENCOUNTER (OUTPATIENT)
Dept: ULTRASOUND IMAGING | Facility: HOSPITAL | Age: 68
Discharge: HOME | End: 2018-07-23
Attending: Other
Payer: MEDICARE

## 2018-07-23 DIAGNOSIS — N20.0 KIDNEY STONE: ICD-10-CM

## 2018-07-23 DIAGNOSIS — N28.9 RENAL LESION: ICD-10-CM

## 2018-07-23 PROCEDURE — 76775 US EXAM ABDO BACK WALL LIM: CPT | Performed by: OTHER

## 2019-01-14 ENCOUNTER — HOSPITAL ENCOUNTER (OUTPATIENT)
Dept: CT IMAGING | Facility: HOSPITAL | Age: 69
Discharge: HOME | End: 2019-01-14
Attending: INTERNAL MEDICINE
Payer: MEDICARE

## 2019-01-14 ENCOUNTER — APPOINTMENT (OUTPATIENT)
Dept: LAB | Facility: HOSPITAL | Age: 69
End: 2019-01-14
Attending: INTERNAL MEDICINE
Payer: MEDICARE

## 2019-01-14 DIAGNOSIS — R10.32 ABDOMINAL PAIN, ACUTE, LEFT LOWER QUADRANT: ICD-10-CM

## 2019-01-14 DIAGNOSIS — Z87.19 HISTORY OF DIVERTICULITIS: ICD-10-CM

## 2019-01-14 LAB
ALBUMIN SERPL BCP-MCNC: 4 G/DL (ref 3.5–4.8)
ALBUMIN/GLOB SERPL: 1.1 {RATIO} (ref 1–2)
ALP SERPL-CCNC: 76 U/L (ref 32–100)
ALT SERPL-CCNC: 31 U/L (ref 14–54)
ANION GAP SERPL CALC-SCNC: 13 MMOL/L (ref 0–18)
AST SERPL-CCNC: 22 U/L (ref 15–41)
BILIRUB SERPL-MCNC: 1.2 MG/DL (ref 0.3–1.2)
BUN SERPL-MCNC: 9 MG/DL (ref 8–20)
BUN/CREAT SERPL: 13.4 (ref 10–20)
CALCIUM SERPL-MCNC: 9.6 MG/DL (ref 8.5–10.5)
CHLORIDE SERPL-SCNC: 99 MMOL/L (ref 95–110)
CO2 SERPL-SCNC: 23 MMOL/L (ref 22–32)
CREAT SERPL-MCNC: 0.67 MG/DL (ref 0.5–1.5)
ERYTHROCYTE [DISTWIDTH] IN BLOOD BY AUTOMATED COUNT: 13.5 % (ref 11–15)
GLOBULIN PLAS-MCNC: 3.8 G/DL (ref 2.5–3.7)
GLUCOSE SERPL-MCNC: 104 MG/DL (ref 70–99)
HCT VFR BLD AUTO: 39.3 % (ref 35–48)
HGB BLD-MCNC: 13.3 G/DL (ref 12–16)
MCH RBC QN AUTO: 29.7 PG (ref 27–32)
MCHC RBC AUTO-ENTMCNC: 33.9 G/DL (ref 32–37)
MCV RBC AUTO: 87.6 FL (ref 80–100)
OSMOLALITY UR CALC.SUM OF ELEC: 279 MOSM/KG (ref 275–295)
PATIENT FASTING: YES
PLATELET # BLD AUTO: 188 K/UL (ref 140–400)
PMV BLD AUTO: 8.8 FL (ref 7.4–10.3)
POTASSIUM SERPL-SCNC: 4.1 MMOL/L (ref 3.3–5.1)
PROT SERPL-MCNC: 7.8 G/DL (ref 5.9–8.4)
RBC # BLD AUTO: 4.48 M/UL (ref 3.7–5.4)
SODIUM SERPL-SCNC: 135 MMOL/L (ref 136–144)
WBC # BLD AUTO: 10.5 K/UL (ref 4–11)

## 2019-01-14 PROCEDURE — 80053 COMPREHEN METABOLIC PANEL: CPT

## 2019-01-14 PROCEDURE — 85027 COMPLETE CBC AUTOMATED: CPT

## 2019-01-14 PROCEDURE — 74176 CT ABD & PELVIS W/O CONTRAST: CPT | Performed by: INTERNAL MEDICINE

## 2019-01-14 PROCEDURE — 36415 COLL VENOUS BLD VENIPUNCTURE: CPT

## 2019-01-15 ENCOUNTER — HOSPITAL ENCOUNTER (OUTPATIENT)
Dept: MAMMOGRAPHY | Facility: HOSPITAL | Age: 69
Discharge: HOME | End: 2019-01-15
Attending: INTERNAL MEDICINE
Payer: MEDICARE

## 2019-01-15 DIAGNOSIS — Z12.31 SCREENING MAMMOGRAM, ENCOUNTER FOR: ICD-10-CM

## 2019-01-15 PROCEDURE — 77063 BREAST TOMOSYNTHESIS BI: CPT | Performed by: INTERNAL MEDICINE

## 2019-01-15 PROCEDURE — 77067 SCR MAMMO BI INCL CAD: CPT | Performed by: INTERNAL MEDICINE

## 2019-02-07 ENCOUNTER — TELEPHONE (OUTPATIENT)
Dept: SURGERY | Age: 69
End: 2019-02-07

## 2019-02-12 ENCOUNTER — APPOINTMENT (OUTPATIENT)
Dept: SURGERY | Age: 69
End: 2019-02-12

## 2019-02-14 ENCOUNTER — OFFICE VISIT (OUTPATIENT)
Dept: SURGERY | Age: 69
End: 2019-02-14

## 2019-04-12 ENCOUNTER — TELEPHONE (OUTPATIENT)
Dept: SURGERY | Age: 69
End: 2019-04-12

## 2019-05-09 ENCOUNTER — OFFICE VISIT (OUTPATIENT)
Dept: SURGERY | Age: 69
End: 2019-05-09

## 2019-05-09 DIAGNOSIS — K57.92 DIVERTICULITIS: Primary | ICD-10-CM

## 2019-05-09 PROCEDURE — 99213 OFFICE O/P EST LOW 20 MIN: CPT | Performed by: COLON & RECTAL SURGERY

## 2019-05-11 PROBLEM — K57.92 DIVERTICULITIS: Status: ACTIVE | Noted: 2019-05-11

## 2019-06-06 ENCOUNTER — APPOINTMENT (OUTPATIENT)
Dept: SURGERY | Age: 69
End: 2019-06-06

## 2019-06-06 ENCOUNTER — OFFICE VISIT (OUTPATIENT)
Dept: SURGERY | Age: 69
End: 2019-06-06

## 2019-06-06 DIAGNOSIS — K57.92 DIVERTICULITIS: Primary | ICD-10-CM

## 2019-06-06 PROCEDURE — 99213 OFFICE O/P EST LOW 20 MIN: CPT | Performed by: COLON & RECTAL SURGERY

## 2019-07-18 ENCOUNTER — APPOINTMENT (OUTPATIENT)
Dept: SURGERY | Age: 69
End: 2019-07-18

## 2019-08-01 ENCOUNTER — OFFICE VISIT (OUTPATIENT)
Dept: SURGERY | Age: 69
End: 2019-08-01

## 2019-08-01 DIAGNOSIS — Z87.19 HISTORY OF DIVERTICULITIS OF COLON: ICD-10-CM

## 2019-08-01 DIAGNOSIS — K57.92 DIVERTICULITIS: Primary | ICD-10-CM

## 2019-08-01 PROCEDURE — 99213 OFFICE O/P EST LOW 20 MIN: CPT | Performed by: COLON & RECTAL SURGERY

## 2019-08-02 PROBLEM — Z87.19 HISTORY OF DIVERTICULITIS OF COLON: Status: ACTIVE | Noted: 2019-08-02

## 2019-08-02 PROBLEM — K57.92 DIVERTICULITIS: Status: RESOLVED | Noted: 2019-05-11 | Resolved: 2019-08-02

## 2019-10-14 ENCOUNTER — APPOINTMENT (OUTPATIENT)
Dept: LAB | Facility: HOSPITAL | Age: 69
End: 2019-10-14
Attending: INTERNAL MEDICINE
Payer: MEDICARE

## 2019-10-14 DIAGNOSIS — M79.602 LEFT ARM PAIN: ICD-10-CM

## 2019-10-14 DIAGNOSIS — R19.7 DIARRHEA, UNSPECIFIED TYPE: ICD-10-CM

## 2019-10-14 DIAGNOSIS — R61 DIAPHORESIS: ICD-10-CM

## 2019-10-14 PROCEDURE — 82550 ASSAY OF CK (CPK): CPT

## 2019-10-14 PROCEDURE — 80053 COMPREHEN METABOLIC PANEL: CPT

## 2019-10-14 PROCEDURE — 84484 ASSAY OF TROPONIN QUANT: CPT

## 2019-10-14 PROCEDURE — 36415 COLL VENOUS BLD VENIPUNCTURE: CPT

## 2020-02-01 ENCOUNTER — HOSPITAL ENCOUNTER (OUTPATIENT)
Dept: MAMMOGRAPHY | Facility: HOSPITAL | Age: 70
Discharge: HOME | End: 2020-02-01
Attending: INTERNAL MEDICINE
Payer: MEDICARE

## 2020-02-01 DIAGNOSIS — Z12.31 ENCOUNTER FOR SCREENING MAMMOGRAM FOR MALIGNANT NEOPLASM OF BREAST: ICD-10-CM

## 2020-02-01 PROCEDURE — 77063 BREAST TOMOSYNTHESIS BI: CPT | Performed by: INTERNAL MEDICINE

## 2020-02-01 PROCEDURE — 77067 SCR MAMMO BI INCL CAD: CPT | Performed by: INTERNAL MEDICINE

## 2020-02-05 ENCOUNTER — HOSPITAL ENCOUNTER (OUTPATIENT)
Dept: MAMMOGRAPHY | Facility: HOSPITAL | Age: 70
Discharge: HOME | End: 2020-02-05
Attending: INTERNAL MEDICINE
Payer: MEDICARE

## 2020-02-05 ENCOUNTER — HOSPITAL ENCOUNTER (OUTPATIENT)
Dept: ULTRASOUND IMAGING | Facility: HOSPITAL | Age: 70
Discharge: HOME | End: 2020-02-05
Attending: INTERNAL MEDICINE
Payer: MEDICARE

## 2020-02-05 DIAGNOSIS — R92.8 ABNORMAL MAMMOGRAM: ICD-10-CM

## 2020-02-05 PROCEDURE — 77062 BREAST TOMOSYNTHESIS BI: CPT | Performed by: INTERNAL MEDICINE

## 2020-02-05 PROCEDURE — 77066 DX MAMMO INCL CAD BI: CPT | Performed by: INTERNAL MEDICINE

## 2020-02-05 PROCEDURE — 76642 ULTRASOUND BREAST LIMITED: CPT | Performed by: INTERNAL MEDICINE

## 2020-02-27 PROBLEM — R73.03 PREDIABETES: Status: ACTIVE | Noted: 2020-02-27

## 2020-07-27 ENCOUNTER — HOSPITAL ENCOUNTER (OUTPATIENT)
Dept: MAMMOGRAPHY | Facility: HOSPITAL | Age: 70
Discharge: HOME | End: 2020-07-27
Attending: INTERNAL MEDICINE
Payer: MEDICARE

## 2020-07-27 DIAGNOSIS — R92.8 ABNORMAL MAMMOGRAM OF LEFT BREAST: ICD-10-CM

## 2020-07-27 PROCEDURE — 77065 DX MAMMO INCL CAD UNI: CPT | Performed by: INTERNAL MEDICINE

## 2020-07-27 PROCEDURE — 77061 BREAST TOMOSYNTHESIS UNI: CPT | Performed by: INTERNAL MEDICINE

## 2020-11-15 NOTE — OPERATIVE REPORT
Operative Note    Patient Name: Leslye Juárez    Preoperative Diagnosis: Left knee osteoarthritis, primary    Postoperative Diagnosis: Left knee osteoarthritis, primary    Primary Surgeon: Whitney Lopez MD     Assistant: Annamaria Valencia, [facility]    Procedures Pregnancy Loss <20 wks

## 2021-03-17 PROBLEM — I70.0 AORTIC CALCIFICATION: Status: ACTIVE | Noted: 2021-03-17

## 2021-03-17 PROBLEM — K76.0 FATTY LIVER: Status: ACTIVE | Noted: 2021-03-17

## 2021-03-17 PROBLEM — I65.23 CAROTID ARTERY CALCIFICATION, BILATERAL: Status: ACTIVE | Noted: 2021-03-17

## 2021-03-17 PROBLEM — H81.10 BENIGN RECURRENT VERTIGO: Status: ACTIVE | Noted: 2021-03-17

## 2021-03-17 PROBLEM — I70.0 AORTIC CALCIFICATION (HCC): Status: ACTIVE | Noted: 2021-03-17

## 2021-09-16 PROBLEM — D05.12 DUCTAL CARCINOMA IN SITU (DCIS) OF LEFT BREAST: Status: ACTIVE | Noted: 2021-09-16

## 2022-02-18 ENCOUNTER — HOSPITAL ENCOUNTER (OUTPATIENT)
Dept: GENERAL RADIOLOGY | Facility: HOSPITAL | Age: 72
Discharge: HOME | End: 2022-02-18
Attending: INTERNAL MEDICINE
Payer: MEDICARE

## 2022-02-18 DIAGNOSIS — M25.561 ACUTE PAIN OF RIGHT KNEE: ICD-10-CM

## 2022-02-18 PROCEDURE — 73560 X-RAY EXAM OF KNEE 1 OR 2: CPT | Performed by: INTERNAL MEDICINE

## 2022-04-05 PROBLEM — R42 POSTURAL DIZZINESS WITH NEAR SYNCOPE: Status: ACTIVE | Noted: 2022-04-05

## 2022-04-05 PROBLEM — I65.23 CAROTID ATHEROSCLEROSIS, BILATERAL: Status: ACTIVE | Noted: 2021-03-17

## 2022-04-05 PROBLEM — R55 POSTURAL DIZZINESS WITH NEAR SYNCOPE: Status: ACTIVE | Noted: 2022-04-05

## 2023-03-06 ENCOUNTER — HOSPITAL ENCOUNTER (OUTPATIENT)
Dept: CT IMAGING | Facility: HOSPITAL | Age: 73
Discharge: HOME | End: 2023-03-06
Attending: NURSE PRACTITIONER
Payer: MEDICARE

## 2023-03-06 DIAGNOSIS — Z87.442 HISTORY OF RENAL STONE: ICD-10-CM

## 2023-03-06 DIAGNOSIS — R10.30 LOWER ABDOMINAL PAIN: ICD-10-CM

## 2023-03-06 DIAGNOSIS — K57.92 DIVERTICULITIS: ICD-10-CM

## 2023-03-06 LAB
CREAT BLD-MCNC: 0.6 MG/DL
GFR SERPLBLD BASED ON 1.73 SQ M-ARVRAT: 95 ML/MIN/1.73M2 (ref 60–?)

## 2023-03-06 PROCEDURE — 82565 ASSAY OF CREATININE: CPT

## 2023-03-06 PROCEDURE — 74177 CT ABD & PELVIS W/CONTRAST: CPT | Performed by: NURSE PRACTITIONER

## 2024-05-08 RX ORDER — MELOXICAM 7.5 MG/1
7.5 TABLET ORAL DAILY
Qty: 30 TABLET | Refills: 1 | Status: SHIPPED | OUTPATIENT
Start: 2024-05-08

## 2024-05-14 ENCOUNTER — HOSPITAL ENCOUNTER (OUTPATIENT)
Dept: NUCLEAR MEDICINE | Facility: HOSPITAL | Age: 74
Discharge: HOME | End: 2024-05-14
Attending: ORTHOPAEDIC SURGERY

## 2024-05-14 DIAGNOSIS — M79.605 LEFT LEG PAIN: ICD-10-CM

## 2024-05-14 PROCEDURE — 78315 BONE IMAGING 3 PHASE: CPT | Performed by: ORTHOPAEDIC SURGERY

## 2024-07-26 RX ORDER — MELOXICAM 7.5 MG/1
7.5 TABLET ORAL DAILY
Qty: 30 TABLET | Refills: 1 | OUTPATIENT
Start: 2024-07-26

## 2025-02-10 ENCOUNTER — ORDER TRANSCRIPTION (OUTPATIENT)
Dept: ADMINISTRATIVE | Facility: HOSPITAL | Age: 75
End: 2025-02-10

## 2025-02-10 DIAGNOSIS — Z13.6 SCREENING FOR HEART DISEASE: Primary | ICD-10-CM

## (undated) DEVICE — TROCAR PIN

## (undated) DEVICE — ABDOMINAL PAD: Brand: CURITY

## (undated) DEVICE — COTTON ROLL: Brand: DEROYAL

## (undated) DEVICE — BATTERY

## (undated) DEVICE — DUAL CUT SAGITTAL BLADE

## (undated) DEVICE — SUTURE VICRYL 2-0 FS-1

## (undated) DEVICE — FAN SPRAY KIT: Brand: PULSAVAC®

## (undated) DEVICE — BLADE SAW SAGITTAL 19.5

## (undated) DEVICE — BANDAGE FLXMSTR 11YDX6IN STRL

## (undated) DEVICE — SUTURE VICRYL 0 J340H

## (undated) DEVICE — ADHESIVE MASTISOL 2/3CC VL

## (undated) DEVICE — SOL  .9 1000ML BAG

## (undated) DEVICE — SOL  .9 1000ML BTL

## (undated) DEVICE — GAUZE SPONGES,12 PLY: Brand: CURITY

## (undated) DEVICE — CHLORAPREP 26ML APPLICATOR

## (undated) DEVICE — Device

## (undated) DEVICE — 3M™ STERI-STRIP™ REINFORCED ADHESIVE SKIN CLOSURES, R1547, 1/2 IN X 4 IN (12 MM X 100 MM), 6 STRIPS/ENVELOPE: Brand: 3M™ STERI-STRIP™

## (undated) DEVICE — ZIMMER PATIENT SPECIFIC INSTRUMENTS

## (undated) DEVICE — DRAPE SHEET LG

## (undated) DEVICE — ZIMMER® STERILE DISPOSABLE TOURNIQUET CUFF WITH PLC, DUAL PORT, SINGLE BLADDER, 30 IN. (76 CM)

## (undated) DEVICE — TOTAL KNEE: Brand: MEDLINE INDUSTRIES, INC.

## (undated) DEVICE — Device: Brand: POWER-FLO®

## (undated) DEVICE — PROXIMATE SKIN STAPLERS (35 WIDE) CONTAINS 35 STAINLESS STEEL STAPLES (FIXED HEAD): Brand: PROXIMATE

## (undated) DEVICE — PAD THRP 16IN WRPON MU LNG STM

## (undated) DEVICE — PSI PERSONA CR PIN GDE FEM-TIB

## (undated) DEVICE — T5 HOOD WITH PEEL AWAY FACE SHIELD

## (undated) DEVICE — POLAR CARE CUBE COOLING UNIT

## (undated) DEVICE — SOL  .9 3000ML

## (undated) DEVICE — COVER SGL STRL LGHT HNDL BLU

## (undated) DEVICE — 48MM PIN

## (undated) DEVICE — DRESSING AQUACEL AG 3.5 X 10

## (undated) DEVICE — BLADE SW .5IN MCHC 2.75IN

## (undated) DEVICE — SUTURE ETHIBOND 1-0 CX30D

## (undated) DEVICE — DRESSING 10X4IN ANMC SAFETAC

## (undated) DEVICE — STERILE LATEX POWDER-FREE SURGICAL GLOVESWITH NITRILE COATING: Brand: PROTEXIS

## (undated) NOTE — IP AVS SNAPSHOT
Patient Demographics     Address  [address] [place] [address] Phone  [phone] [facility])  [phone] (Work)  [phone] (Mobile) *Preferred* E-mail Address  [email or web address]      Emergency Contact(s)     Name Relation Home Work [employer] Notes to patient:  ALERT: take this medication as prescribed, to prevent blood clots and pulmonary embolism. If you are unable to afford-contact your SURGEON. Take 1 tablet (2.5 mg total) by mouth 2 (two) times daily.   Stop taking on:  10/3/2017   Rosa Anne Commonly known as:  PROTONIX  Start taking on:  9/22/2017      Take 1 tablet (40 mg total) by mouth every morning before breakfast.   Hal Correia MD         Potassium Chloride ER 20 MEQ Tbcr  Commonly known as:  K-DUR M20  Next dose due:   Tonight [id number] Pantoprazole Sodium (PROTONIX) EC tab 40 mg 09/21/17 0523 Given      [id number] Potassium Chloride ER (K-DUR M20) CR tab 20 mEq 09/20/17 2033 Given      [id number] Potassium Chloride ER (K-DUR M20) CR tab 20 mEq 09/21/17 0755 Given      [id number] Resp  16 Filed at 09/21/2017 0454   Temp  98.5 °F (36.9 °C) Filed at 09/21/2017 0454   SpO2  98 % Filed at 09/21/2017 0459      Patient's Most Recent Weight    Flowsheet Row Most Recent Value   Patient Weight  93 kg (205 lb)         Lab Results Last 24 [address] CBC W/ DIFFERENTIAL[[id number]]          Abnormal            Final result                 Please view results for these tests on the individual orders.     Specimen Information    Type Source Collected On   Blood — 09/21/17 0444            Testing Performed • Type II or unspecified type diabetes mellitus without mention of complication, not stated as uncontrolled    • Vertigo      Past Surgical History:  No date: CHOLECYSTECTOMY  No date: HYSTERECTOMY      Comment: one ovary removed  No date: TOTAL KNEE REPLA Gastrointestinal: Positive for nausea and vomiting. Negative for abdominal pain. Genitourinary: Negative for dysuria and flank pain. Musculoskeletal: Positive for joint pain. Neurological: Negative for dizziness, light-headedness and headaches.    Psy Assessment/Plan:[MH.1]   Principal Problem:    Primary osteoarthritis of left knee  Active Problems:    Essential hypertension    Multiple lung nodules on CT    Pure hypercholesterolemia    Type 2 diabetes mellitus with hyperglycemia ([address]    Bradycardia[M Discharge Summary    Jennifer Horan Patient Status:  Inpatient    1950 MRN [id number]   Location [facility] 4W/SW/SE Attending Israel Canas MD   Hosp Day # 3 PCP Bk Kaur MD     Date of Admission: 2017   Date of Discharge therapy, she was transferred to [facility] for a short stay for strengthening. She was instructed to follow up with myself and ortho post discharge from inpatient rehab. She is to call with any problems or concerns in the meantime.   The case Take 200 mg by mouth 2 (two) times daily. SIMVASTATIN 40 MG Oral Tab  TAKE 1 TABLET BY MOUTH DAILY              Discharge Diet: Diabetic diet    Discharge Activity: Up with assistance    Follow up:      Follow-up Information     Benedict Jj MD. Sc 4:00 PM  Version 1 of 1    Author:  Analisa Hunt PTA Service:  Rehab Author Type:  Physical Therapist    Filed:  9/20/2017  4:00 PM Date of Service:  9/20/2017  3:57 PM Status:  Signed    :  Analisa Hunt PTA (Physical Therapist) • Obesity (BMI 35.0-39.9 without comorbidity)    • Osteoarthritis    • S/P cholecystectomy    • S/P NISHI (total abdominal hysterectomy)    • Type II or unspecified type diabetes mellitus without mention of complication, not stated as uncontrolled    • Stephanie -   Turning over in bed (including adjusting bedclothes, sheets and blankets)?: A Little   -   Sitting down on and standing up from a chair with arms (e.g., wheelchair, bedside commode, etc.): A Little   -   Moving from lying on back to sitting on the side Goal #4 Patient will negotiate 8 stairs/one curb w/ assistive device and supervision   Goal #4   Current Status NT   Goal #5  AROM 0 degrees extension to 95 degrees flexion     Goal #5   Current Status In progress   Goal #6 Patient independently performs h History related to current admission: The pt presented for elective L TKA      Problem List  Principal Problem:    Primary osteoarthritis of left knee  Active Problems:    Essential hypertension    Multiple lung nodules on CT    Pure hypercholesterolemia RANGE OF MOTION AND STRENGTH ASSESSMENT  Upper extremity ROM and strength are within functional limits     Lower extremity ROM is within functional limits; L knee AAROM 0-80 degrees    Lower extremity strength is within functional limits; pt displayed L LE session/findings; All patient questions and concerns addressed    CURRENT GOALS    Goals to be met by: 9/25/17  Patient Goal Patient's self-stated goal is: to walk without pain   Goal #1 Patient is able to demonstrate supine - sit EOB @ level: modified inde displayed ROM and strength deficits related her L TKA during her session. The pt was also instructed on HEP as noted below and given written information regarding her HEP for reference. The pt displayed good return demo x 10 reps for each exercise.  The pt • HTN (hypertension) 5/21/2014   • Hypomagnesemia    • Hypomagnesemia    • Multiple lung nodules on CT    • Obesity (BMI 35.0-39.9 without comorbidity)    • Osteoarthritis    • S/P cholecystectomy    • S/P NISHI (total abdominal hysterectomy)    • Type II or How much difficulty does the patient currently have. ..  -   Turning over in bed (including adjusting bedclothes, sheets and blankets)?: A Little   -   Sitting down on and standing up from a chair with arms (e.g., wheelchair, bedside commode, etc.): A Darriantl Goal #4 Patient will negotiate 8 stairs/one curb w/ assistive device and supervision   Goal #4   Current Status    Goal #5  AROM 0 degrees extension to 95 degrees flexion     Goal #5   Current Status    Goal #6 Patient independently performs home exercise In this OT evaluation patient presents with the following impairments: pain, post op limits of weakness and ROM. These deficits manifest functionally while performing LE self care and functional xfers/mobility.    After initially demo some increased anxie Comment: one ovary removed  No date: TOTAL KNEE REPLACEMENT Left[SF.2]    HOME SITUATION[SF.1]  Type of Home: House  Home Layout: Multi-level (4 GERARDO, 8 to BR)  Lives With: Spouse (Nany Chong is retired and able to assist)    Toilet and Equipment: [employer] Toilet Transfer:[SF.1] CGA with cues for hand and foot placement[SF.3]  Chair Transfer:[SF.1] CGA[SF.3]      Bedroom Mobility:[SF.1] CGA for bed to and from bathroom with cues to manage walker, sequence, improve posture[SF.3]      FUNCTIONAL ADL ASSESSMENT